# Patient Record
Sex: MALE | Race: WHITE | NOT HISPANIC OR LATINO | Employment: UNEMPLOYED | URBAN - METROPOLITAN AREA
[De-identification: names, ages, dates, MRNs, and addresses within clinical notes are randomized per-mention and may not be internally consistent; named-entity substitution may affect disease eponyms.]

---

## 2018-05-31 ENCOUNTER — EVALUATION (OUTPATIENT)
Dept: PHYSICAL THERAPY | Facility: CLINIC | Age: 58
End: 2018-05-31
Payer: COMMERCIAL

## 2018-05-31 DIAGNOSIS — M54.16 LUMBAR RADICULOPATHY: Primary | ICD-10-CM

## 2018-05-31 PROCEDURE — G8990 OTHER PT/OT CURRENT STATUS: HCPCS | Performed by: PHYSICAL THERAPIST

## 2018-05-31 PROCEDURE — 97161 PT EVAL LOW COMPLEX 20 MIN: CPT | Performed by: PHYSICAL THERAPIST

## 2018-05-31 PROCEDURE — G8991 OTHER PT/OT GOAL STATUS: HCPCS | Performed by: PHYSICAL THERAPIST

## 2018-05-31 NOTE — LETTER
2018    Savanah Bellamy, [address] [facility] [address] [facility] [address]    Patient: Trey Lion   YOB: 1960   Date of Visit: 2018     Encounter Diagnosis     ICD-10-CM    1  Lumbar radiculopathy M54 16        Dear Dr Anum Eddy:    Please review the attached Plan of Care from Justina Arce recent visit  Please verify that you agree therapy should continue by signing the attached document and sending it back to our office  If you have any questions or concerns, please don't hesitate to call  Sincerely,    Reginaldo Mott, PT      Referring Provider:      I certify that I have read the below Plan of Care and certify the need for these services furnished under this plan of treatment while under my care  Savanah Bellamy, [address] [facility] [address] [facility] [address]  VIA Facsimile: [phone]          PT Evaluation     Today's date: 2018  Patient name: Trey Lion  : 1960  MRN: [id number]  Referring provider: LOY Mcmullen  Dx: No diagnosis found  Assessment/Plan     Pt  presents to outpatient PT with pain, decreased rom, decreased strength and decreased functional mobility  He will benefit from skilled PT to address these deficits in order to achieve his goals and maximize his functional mobility  PT 2-3x's/week for 4-6 weeks    Short Term Goals: Independent performance of initial hep  Decrease pain 2 points on VAS      Long Term Goals: Independent performance of comprehensive hep  Work performance is returned to max level of function  Performance of IADL's is returned to max level of function  Performance in recreational activities is improved to max level of function    Pain current:  2 /10  At worst: 6/10        Subjective     Pt reports that he underwent a lumbar microdiscectomy 1 month ago  Reports that he has noticed sig pain since that time    Reports mod reduction in radicular symptoms  Reports continued mild numbness in his calf but never above the knee  Reports mod pain in his lumbar spine  Current complaints included pain when sitting or standing for extended periods of time  Pain reeducation with sidelying  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  Wears a lumbar stabilization brace with good pain  reduction  Reports that he would like to return to walking and walking his dog for recreation        Objective     ROM:   Flexion:  Min limited   Extension:max limited   Right Rotation:mod limited   Left Rotation:mod limited   Right Lateral Flexion: mod limited   Left Lateral Flexion: mod limited        Poor control of abdominals noted with TaA  Hypomobility noted with spring testing  Straight Leg Raise: negr  Slump Test: :neg  Prone Knee Bend: neg   Directional testing:no change in symptoms  Decreased flexibility:  Hs, quads, hip flexors      Precautions: none    Daily Treatment Diary       Manuals             Lumbar pa's                                                    Exercise Diary              bike             Hs stretch             Prone quad stretch             ltr             TaA             taa bridge             TaA clamshell             TaA squats             Step outs             pbal flexion stretch                                                                                                                                                                         Modalities

## 2018-05-31 NOTE — PROGRESS NOTES
PT Evaluation     Today's date: 2018  Patient name: Aishwarya Denny  : 1960  MRN: [id number]  Referring provider: LOY Bruno  Dx: No diagnosis found  Assessment/Plan     Pt  presents to outpatient PT with pain, decreased rom, decreased strength and decreased functional mobility  He will benefit from skilled PT to address these deficits in order to achieve his goals and maximize his functional mobility  PT 2-3x's/week for 4-6 weeks    Short Term Goals: Independent performance of initial hep  Decrease pain 2 points on VAS      Long Term Goals: Independent performance of comprehensive hep  Work performance is returned to max level of function  Performance of IADL's is returned to max level of function  Performance in recreational activities is improved to max level of function    Pain current:  2 /10  At worst: 6/10        Subjective     Pt reports that he underwent a lumbar microdiscectomy 1 month ago  Reports that he has noticed sig pain since that time  Reports mod reduction in radicular symptoms  Reports continued mild numbness in his calf but never above the knee  Reports mod pain in his lumbar spine  Current complaints included pain when sitting or standing for extended periods of time  Pain reeducation with sidelying  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  Wears a lumbar stabilization brace with good pain  reduction  Reports that he would like to return to walking and walking his dog for recreation        Objective     ROM:   Flexion:  Min limited   Extension:max limited   Right Rotation:mod limited   Left Rotation:mod limited   Right Lateral Flexion: mod limited   Left Lateral Flexion: mod limited        Poor control of abdominals noted with TaA  Hypomobility noted with spring testing  Straight Leg Raise: negr  Slump Test: :neg  Prone Knee Bend: neg   Directional testing:no change in symptoms  Decreased flexibility:  Hs, quads, hip flexors      Precautions: none    Daily Treatment Diary       Manuals             Lumbar pa's                                                    Exercise Diary              bike             Hs stretch             Prone quad stretch             ltr             TaA             taa bridge             TaA clamshell             TaA squats             Step outs             pbal flexion stretch                                                                                                                                                                         Modalities

## 2018-06-05 ENCOUNTER — TRANSCRIBE ORDERS (OUTPATIENT)
Dept: PHYSICAL THERAPY | Facility: CLINIC | Age: 58
End: 2018-06-05

## 2018-06-05 DIAGNOSIS — M54.16 LUMBAR RADICULOPATHY: Primary | ICD-10-CM

## 2018-06-06 ENCOUNTER — OFFICE VISIT (OUTPATIENT)
Dept: PHYSICAL THERAPY | Facility: CLINIC | Age: 58
End: 2018-06-06
Payer: COMMERCIAL

## 2018-06-06 DIAGNOSIS — M54.16 LUMBAR RADICULOPATHY: Primary | ICD-10-CM

## 2018-06-06 PROCEDURE — 97140 MANUAL THERAPY 1/> REGIONS: CPT

## 2018-06-06 PROCEDURE — 97530 THERAPEUTIC ACTIVITIES: CPT

## 2018-06-06 PROCEDURE — 97110 THERAPEUTIC EXERCISES: CPT

## 2018-06-06 NOTE — PROGRESS NOTES
Daily Note     Today's date: 2018  Patient name: Osmin Billings  : 1960  MRN: [id number]  Referring provider: Mathis Skiff, CRNP  Dx:   Encounter Diagnosis     ICD-10-CM    1  Lumbar radiculopathy M54 16                   Subjective: Patient reports he is feeling better since the initial evaluation  Patient reports compliance with performing the home exercise program - also reports he has been going on walks around his neighborhood and reports he is feeling "more stable "  Patient reports he is trying to wean off pain medication  Objective: See treatment diary below  PT performs mobilizations as noted  Precautions: none    Daily Treatment Diary       Manuals             Lumbar pa's KL                                                   Exercise Diary              bike 6 min            Hs stretch 30"x3            Prone quad stretch manual  30"x3            ltr 10"x10            TaA 5"x10            TaA bridge 5"x10            TaA clamshell sidelying  red tb  5"x10            TaA squats 1x10            Step outs             pball flexion stretch 10"x10                                                                                                                                                                        Modalities                                         Assessment: Patient complains of mild discomfort in his lower back throughout therapeutic exercise program     Plan: Continue treatment as per PT plan of care

## 2018-06-08 ENCOUNTER — OFFICE VISIT (OUTPATIENT)
Dept: PHYSICAL THERAPY | Facility: CLINIC | Age: 58
End: 2018-06-08
Payer: COMMERCIAL

## 2018-06-08 DIAGNOSIS — M54.16 LUMBAR RADICULOPATHY: Primary | ICD-10-CM

## 2018-06-08 PROCEDURE — 97110 THERAPEUTIC EXERCISES: CPT | Performed by: PHYSICAL THERAPIST

## 2018-06-08 PROCEDURE — 97530 THERAPEUTIC ACTIVITIES: CPT | Performed by: PHYSICAL THERAPIST

## 2018-06-08 PROCEDURE — 97140 MANUAL THERAPY 1/> REGIONS: CPT | Performed by: PHYSICAL THERAPIST

## 2018-06-08 NOTE — PROGRESS NOTES
Daily Note     Today's date: 2018  Patient name: Judy Ward  : 1960  MRN: [id number]  Referring provider: LOY Padron  Dx:   No diagnosis found  Subjective: Pt reports mild increase in soreness after his LV that he attributes to bridges and manual tx LV  Objective: See treatment diary below  PT performs mobilizations as noted  Precautions: none    Daily Treatment Diary       Manuals            Lumbar pa's KL kl                                                  Exercise Diary              bike 6 min 8'           Hs stretch 30"x3 3           Prone quad stretch manual  30"x3 3             ltr 10"x10 20           TaA 5"x10 10           TaA bridge 5"x10            TaA clamshell sidelying  red tb  5"x10 Red tb hooklying x20           TaA squats 1x10 10           Step outs             pball flexion stretch 10"x10 10"x10           TaA ball squeeze  5"x20                                                                                                                                                          Modalities                                         Assessment: Modified therex as listed, pt reports increased mm spasm and manual quad stretching this may be cause by positioning in prone, attempt sidelying NV  No pain reported t/o other therex  Plan: Continue treatment as per PT plan of care

## 2018-06-12 ENCOUNTER — OFFICE VISIT (OUTPATIENT)
Dept: PHYSICAL THERAPY | Facility: CLINIC | Age: 58
End: 2018-06-12
Payer: COMMERCIAL

## 2018-06-12 DIAGNOSIS — M54.16 LUMBAR RADICULOPATHY: Primary | ICD-10-CM

## 2018-06-12 PROCEDURE — 97140 MANUAL THERAPY 1/> REGIONS: CPT

## 2018-06-12 PROCEDURE — 97110 THERAPEUTIC EXERCISES: CPT

## 2018-06-12 PROCEDURE — 97530 THERAPEUTIC ACTIVITIES: CPT

## 2018-06-12 NOTE — PROGRESS NOTES
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  Patient reports he had increased back pain last night rated 4/10 and took pain medication because of this  Patient reports he is feeling better this morning - back pain rated 1/10  Patient also reports he was able to do some "light morning stretching "    Objective: See treatment diary below  PT performs mobilizations as noted  Precautions: none    Daily Treatment Diary       Manuals          Lumbar pa's KL kl KL                                             Exercise Diary             bike 6 min 8' 10'         Hs stretch 30"x3 3 30"x4         Prone quad stretch manual  30"x3 3   30"x4         ltr 10"x10 20 10"x10         TaA 5"x10 10 5"x10         TaA bridge 5"x10  NP         TaA clamshell sidelying  red tb  5"x10 Red tb hooklying x20 hooklying  green tb  5"x10         TaA wall squats 1x10 10 1x10         Step outs            pball flexion stretch 10"x10 10"x10 10"x10         TaA ball squeeze  5"x20 5"x10         Seated lpd   green  5"x10                                                                                                                                 Modalities                                      Assessment: Additional seated theraband lat pull down is performed with complaints of fatigue but not pain  Bridges not performed because of back pain  Plan: Continue treatment as per PT plan of care

## 2018-06-14 ENCOUNTER — APPOINTMENT (OUTPATIENT)
Dept: PHYSICAL THERAPY | Facility: CLINIC | Age: 58
End: 2018-06-14
Payer: COMMERCIAL

## 2018-06-15 ENCOUNTER — APPOINTMENT (OUTPATIENT)
Dept: PHYSICAL THERAPY | Facility: CLINIC | Age: 58
End: 2018-06-15
Payer: COMMERCIAL

## 2018-06-19 ENCOUNTER — APPOINTMENT (OUTPATIENT)
Dept: PHYSICAL THERAPY | Facility: CLINIC | Age: 58
End: 2018-06-19
Payer: COMMERCIAL

## 2018-06-21 ENCOUNTER — APPOINTMENT (OUTPATIENT)
Dept: PHYSICAL THERAPY | Facility: CLINIC | Age: 58
End: 2018-06-21
Payer: COMMERCIAL

## 2018-06-22 ENCOUNTER — APPOINTMENT (OUTPATIENT)
Dept: PHYSICAL THERAPY | Facility: CLINIC | Age: 58
End: 2018-06-22
Payer: COMMERCIAL

## 2018-06-26 ENCOUNTER — APPOINTMENT (OUTPATIENT)
Dept: PHYSICAL THERAPY | Facility: CLINIC | Age: 58
End: 2018-06-26
Payer: COMMERCIAL

## 2018-06-29 ENCOUNTER — APPOINTMENT (OUTPATIENT)
Dept: PHYSICAL THERAPY | Facility: CLINIC | Age: 58
End: 2018-06-29
Payer: COMMERCIAL

## 2025-01-13 ENCOUNTER — TELEPHONE (OUTPATIENT)
Age: 65
End: 2025-01-13

## 2025-01-13 NOTE — TELEPHONE ENCOUNTER
Contacted patient in regards to request for EAP services in attempts to notify patient that the requested provider works through [facility] facility and is not available for services for patient's outside of their working PCP offices; to then encourage patient to reach out to EAP program again to obtain a different provider. LVM for patient to contact intake dept in regards to EAP Reques

## 2025-01-13 NOTE — TELEPHONE ENCOUNTER
Patient's wife called and reported that pt. requested EAP services for marriage counseling. Writer noticed that there is not a AVERY on file to speak with wife and that pt. Had to request the services for himself.     Patient got on the phone and requested EAP services for marriage counseling. Wife provided certification number: [id number] for Mary [facility] Lawson.     Please call pt. To schedule.

## 2025-01-16 ENCOUNTER — TELEPHONE (OUTPATIENT)
Age: 65
End: 2025-01-16

## 2025-01-16 NOTE — TELEPHONE ENCOUNTER
Patient called and reported that he is interested in EAP services. He added that he found some provider names and authorization numbers. He also reported that he authorizes his wife (Brionna Park) to speak for him.     EAP providers are: Mary Lou Lawson - auth number [id number], Nelly Ramires -auth number [id number].     Please call wife back to discuss further steps: [phone].